# Patient Record
(demographics unavailable — no encounter records)

---

## 2024-10-08 NOTE — ASSESSMENT
[FreeTextEntry1] : Patient comes in for follow-up of the right wrist pain.  He says he still having some pain in the wrist, about a 5 or 6.  He says the finger numbness and tingling is improved significantly. He does not use the brace at night.  He does not have other complaints.  Right upper extremity: No gross deformities visualized, slightly tender to palpation over the SL, negative provocative test, mildly positive Tinel's at the wrist, mildly positive Durkan's, neurovascular intact  The patient's carpal tunnel symptoms have resolved.  He is doing well.  He will continue with what he has been doing for that.  As for the wrist pain, he says that the 5 or 6 which I still believe is significant.  He had a partial SL tear.  I am recommending a new MRI as it has been almost a year since his injury.  He will return once is complete.

## 2024-11-13 NOTE — ASSESSMENT
[FreeTextEntry1] : Patient comes in for follow-up of the right wrist pain.  He only has numbness occasionally in his fingers and does not have much pain in his wrist either. He has no other complaints.   Right upper extremity: No gross deformities visualized, slightly tender to palpation over the SL, negative provocative test, mildly positive Tinel's at the wrist, mildly positive Durkan's, neurovascular intact  MRI images individually reviewed shows mild degeneration of the scapholunate ligament with suspected ganglion cyst at the dorsal aspect of the SL  The patient's carpal tunnel symptoms have resolved.  It is only occurring occasionally we do not need to address that for now.  He is doing well.  He will continue with what he has been doing for that.  As for the wrist pain, he says that it is mostly resolved. He may follow up as needed.